# Patient Record
Sex: FEMALE | Race: WHITE | Employment: UNEMPLOYED | URBAN - NONMETROPOLITAN AREA
[De-identification: names, ages, dates, MRNs, and addresses within clinical notes are randomized per-mention and may not be internally consistent; named-entity substitution may affect disease eponyms.]

---

## 2020-11-20 LAB
ALBUMIN SERPL-MCNC: 2.8 G/DL
ALP BLD-CCNC: 77 U/L
ALT SERPL-CCNC: 24 U/L
ANION GAP SERPL CALCULATED.3IONS-SCNC: 8 MMOL/L
AST SERPL-CCNC: 19 U/L
BASOPHILS ABSOLUTE: NORMAL
BASOPHILS RELATIVE PERCENT: NORMAL
BILIRUB SERPL-MCNC: 0.2 MG/DL (ref 0.1–1.4)
BUN BLDV-MCNC: 13 MG/DL
CALCIUM SERPL-MCNC: 8.6 MG/DL
CHLORIDE BLD-SCNC: 113 MMOL/L
CO2: 25 MMOL/L
CREAT SERPL-MCNC: 0.63 MG/DL
EOSINOPHILS ABSOLUTE: NORMAL
EOSINOPHILS RELATIVE PERCENT: NORMAL
GFR CALCULATED: 119
GLUCOSE BLD-MCNC: 87 MG/DL
HCT VFR BLD CALC: 41.3 % (ref 36–46)
HEMOGLOBIN: 14.3 G/DL (ref 12–16)
LYMPHOCYTES ABSOLUTE: NORMAL
LYMPHOCYTES RELATIVE PERCENT: NORMAL
MCH RBC QN AUTO: NORMAL PG
MCHC RBC AUTO-ENTMCNC: NORMAL G/DL
MCV RBC AUTO: NORMAL FL
MONOCYTES ABSOLUTE: NORMAL
MONOCYTES RELATIVE PERCENT: NORMAL
NEUTROPHILS ABSOLUTE: NORMAL
NEUTROPHILS RELATIVE PERCENT: NORMAL
PLATELET # BLD: 294 K/ΜL
PMV BLD AUTO: NORMAL FL
POTASSIUM SERPL-SCNC: 3.8 MMOL/L
RBC # BLD: 4.28 10^6/ΜL
SODIUM BLD-SCNC: 142 MMOL/L
TOTAL PROTEIN: 6.2
WBC # BLD: 6.4 10^3/ML

## 2021-02-02 RX ORDER — VARENICLINE TARTRATE 1 MG/1
1 TABLET, FILM COATED ORAL 2 TIMES DAILY
COMMUNITY

## 2021-02-02 RX ORDER — IBUPROFEN 800 MG/1
800 TABLET ORAL EVERY 6 HOURS PRN
COMMUNITY

## 2021-02-02 RX ORDER — ALBUTEROL SULFATE 90 UG/1
2 AEROSOL, METERED RESPIRATORY (INHALATION) EVERY 6 HOURS PRN
COMMUNITY

## 2021-02-02 RX ORDER — MONTELUKAST SODIUM 10 MG/1
10 TABLET ORAL NIGHTLY
COMMUNITY

## 2021-02-02 RX ORDER — BUDESONIDE AND FORMOTEROL FUMARATE DIHYDRATE 80; 4.5 UG/1; UG/1
2 AEROSOL RESPIRATORY (INHALATION) 2 TIMES DAILY
COMMUNITY

## 2021-02-03 ENCOUNTER — OFFICE VISIT (OUTPATIENT)
Dept: NEPHROLOGY | Age: 31
End: 2021-02-03
Payer: COMMERCIAL

## 2021-02-03 VITALS
SYSTOLIC BLOOD PRESSURE: 118 MMHG | OXYGEN SATURATION: 98 % | DIASTOLIC BLOOD PRESSURE: 78 MMHG | HEART RATE: 74 BPM | WEIGHT: 197 LBS

## 2021-02-03 DIAGNOSIS — R80.0 ISOLATED NON-NEPHROTIC PROTEINURIA: ICD-10-CM

## 2021-02-03 DIAGNOSIS — N18.1 CKD (CHRONIC KIDNEY DISEASE) STAGE 1, GFR 90 ML/MIN OR GREATER: Primary | ICD-10-CM

## 2021-02-03 PROCEDURE — G8484 FLU IMMUNIZE NO ADMIN: HCPCS | Performed by: INTERNAL MEDICINE

## 2021-02-03 PROCEDURE — 99205 OFFICE O/P NEW HI 60 MIN: CPT | Performed by: INTERNAL MEDICINE

## 2021-02-03 PROCEDURE — G8421 BMI NOT CALCULATED: HCPCS | Performed by: INTERNAL MEDICINE

## 2021-02-03 PROCEDURE — 1036F TOBACCO NON-USER: CPT | Performed by: INTERNAL MEDICINE

## 2021-02-03 PROCEDURE — G8427 DOCREV CUR MEDS BY ELIG CLIN: HCPCS | Performed by: INTERNAL MEDICINE

## 2021-02-03 ASSESSMENT — ENCOUNTER SYMPTOMS
ABDOMINAL PAIN: 0
DIARRHEA: 0
VOMITING: 0
SHORTNESS OF BREATH: 0
NAUSEA: 0
SORE THROAT: 0
EYE PAIN: 0
BACK PAIN: 0
EYES NEGATIVE: 1
COUGH: 0

## 2021-02-03 NOTE — PROGRESS NOTES
[facility]         Outpatient Initial consult note         2/3/2021 9:50 AM    [address] [address]  Dept: [address] [place] [address] Street: [phone]      Patient Name:   Christo Botello  YOB: 1990  Primary Care Physician:  Cheikh Sanon MD     Chief Complaint : Evaluation of  proteinuria     History of presenting illness :Christo Botello is a 26 y.o.   female with Past Medical History as stated below was sent / referred by Cheikh Sanon MD forevaluation of the above problem. Patient has no history of hypertension for 0 years. Patient has no history of diabetes mellitus. The patient admits to a history of renal stones andadmits to occasional NSAID use. Patient has a history of proteinuria. Patient Never had a kidney biopsy done. Patient does not use Herbal remedies/vitamin supplements. Patient denies frequency, hematuria, hesitancy. Patient has a family history of kidney disease. Patient has been having swelling, bilateral lower extremities, very severe since almost like 6 months. Has seen the PCP who has given some diuretic which helped some and the patient also used to drink caffeinated drinks which she has stopped and seems to have helped. No associated shortness of breath. No other modifying factors.   Patient had 24-hour protein which said that she is having 1.5 g of protein in the urine and so she was referred to us for further evaluation and management     Past History :     Past Medical History:   Diagnosis Date    UTI (urinary tract infection)      Past Surgical History:   Procedure Laterality Date    KNEE SURGERY Left     x 2     Social History     Socioeconomic History    Marital status:      Spouse name: Not on file    Number of children: Not on file    Years of education: Not on file    Highest education level: Not on file Occupational History    Not on file   Social Needs    Financial resource strain: Not on file    Food insecurity     Worry: Not on file     Inability: Not on file    Transportation needs     Medical: Not on file     Non-medical: Not on file   Tobacco Use    Smoking status: Former Smoker     Packs/day: 0.50    Smokeless tobacco: Never Used   Substance and Sexual Activity    Alcohol use: Not on file    Drug use: Not on file    Sexual activity: Not on file   Lifestyle    Physical activity     Days per week: Not on file     Minutes per session: Not on file    Stress: Not on file   Relationships    Social connections     Talks on phone: Not on file     Gets together: Not on file     Attends Hoahaoism service: Not on file     Active member of club or organization: Not on file     Attends meetings of clubs or organizations: Not on file     Relationship status: Not on file    Intimate partner violence     Fear of current or ex partner: Not on file     Emotionally abused: Not on file     Physically abused: Not on file     Forced sexual activity: Not on file   Other Topics Concern    Not on file   Social History Narrative    Not on file     Family History   Problem Relation Age of Onset    Diabetes Father     Hypertension Father     Cancer Paternal Aunt      Medications & Allergies :      Prior to Admission medications    Medication Sig Start Date End Date Taking?  Authorizing Provider   albuterol sulfate HFA (VENTOLIN HFA) 108 (90 Base) MCG/ACT inhaler Inhale 2 puffs into the lungs every 6 hours as needed for Wheezing   Yes Historical Provider, MD   budesonide-formoterol (SYMBICORT) 80-4.5 MCG/ACT AERO Inhale 2 puffs into the lungs 2 times daily   Yes Historical Provider, MD   ibuprofen (ADVIL;MOTRIN) 800 MG tablet Take 800 mg by mouth every 6 hours as needed for Pain   Yes Historical Provider, MD   montelukast (SINGULAIR) 10 MG tablet Take 10 mg by mouth nightly   Yes Historical Provider, MD   varenicline (CHANTIX) 1 MG tablet Take 1 mg by mouth 2 times daily   Yes Historical Provider, MD     Allergies: Patient has no known allergies. Review of Systems Physical Exam   Review of Systems   Constitutional: Positive for fatigue and unexpected weight change. Negative for activity change, chills and fever. HENT: Negative. Negative for ear pain and sore throat. Eyes: Negative. Negative for pain. Respiratory: Negative for cough and shortness of breath. Cardiovascular: Positive for leg swelling. Negative for chest pain. Gastrointestinal: Negative for abdominal pain, diarrhea, nausea and vomiting. Genitourinary: Negative for dysuria, frequency and hematuria. Musculoskeletal: Negative for back pain and neck pain. Skin: Negative for rash and wound. Neurological: Positive for headaches. Negative for dizziness and light-headedness. Psychiatric/Behavioral: Negative for agitation and confusion. Physical Exam  Vitals signs reviewed. Constitutional:       General: She is not in acute distress. Appearance: She is obese. She is not diaphoretic. HENT:      Head: Normocephalic and atraumatic. Right Ear: External ear normal.      Left Ear: External ear normal.      Nose: Nose normal.      Mouth/Throat:      Mouth: Mucous membranes are moist.   Eyes:      General: No scleral icterus. Right eye: No discharge. Left eye: No discharge. Conjunctiva/sclera: Conjunctivae normal.   Neck:      Musculoskeletal: Normal range of motion and neck supple. Thyroid: No thyromegaly. Vascular: No JVD. Cardiovascular:      Rate and Rhythm: Normal rate and regular rhythm. Heart sounds: Normal heart sounds. No murmur. Pulmonary:      Effort: Pulmonary effort is normal. No respiratory distress. Breath sounds: Normal breath sounds. No stridor. No wheezing or rales. Chest:      Chest wall: No tenderness. Abdominal:      General: Bowel sounds are normal. There is distension. Palpations: Abdomen is soft. Tenderness: There is no abdominal tenderness. Musculoskeletal:         General: No tenderness. Right lower leg: Edema present. Left lower leg: Edema present. Comments: trace   Skin:     General: Skin is warm and dry. Findings: No erythema or rash. Neurological:      General: No focal deficit present. Mental Status: She is alert and oriented to person, place, and time. Psychiatric:         Mood and Affect: Mood normal.         Behavior: Behavior normal.          Vitals   Vitals:    02/03/21 0911   BP: 118/78   Site: Left Upper Arm   Position: Sitting   Cuff Size: Medium Adult   Pulse: 74   SpO2: 98%   Weight: 197 lb (89.4 kg)        Labs, Radiology and Tests :    Labs -    11/20           Potassium 3.8           BUN 13           Creatinine 0.63           eGFR 119                       UPCR 1.5(24)           UMCR                          Renal Ultrasound Scan --will order       Other : old  lab data have been reviewed and noted that patient does have some proteinuria from prior and her creatinine in 2018 is around 0.78    Assessment    1. Renal -as per MDRD patient is chronic kidney disease stage I due to presence of significant proteinuria  -Exact etiology unclear. I will quantify the protein again, send serologic work-up  -Obtain a baseline renal ultrasound scan as well  -Need to consider renal biopsy. Advised not to take nonsteroidal anti-inflammatories. 2. Electrolytes appear to be within normal limits  3. Blood pressure well controlled does not carry diagnosis of hypertension  4. Proteinuria-plan as mentioned above  5. Medications reviewed discussed with the patient  6.  Follow-up in 4 weeks  Plan     Orders Placed This Encounter   Procedures    US RENAL LIMITED    Comprehensive Metabolic Panel    Urinalysis with Microscopic    Microalbumin / Creatinine Urine Ratio    Protein / creatinine ratio, urine    CLINT Screen with Reflex   

## 2021-02-04 DIAGNOSIS — N18.1 CKD (CHRONIC KIDNEY DISEASE) STAGE 1, GFR 90 ML/MIN OR GREATER: ICD-10-CM

## 2021-02-17 LAB
ALBUMIN SERPL-MCNC: 2.7 G/DL
ALP BLD-CCNC: 74 U/L
ALT SERPL-CCNC: 21 U/L
ANION GAP SERPL CALCULATED.3IONS-SCNC: 9 MMOL/L
ANTIBODY: NORMAL
AST SERPL-CCNC: 15 U/L
BILIRUB SERPL-MCNC: 0.2 MG/DL (ref 0.1–1.4)
BUN BLDV-MCNC: 11 MG/DL
CALCIUM SERPL-MCNC: 8.6 MG/DL
CHLORIDE BLD-SCNC: 111 MMOL/L
CO2: 25 MMOL/L
CREAT SERPL-MCNC: 0.61 MG/DL
GFR CALCULATED: 120
GLUCOSE BLD-MCNC: 89 MG/DL
POTASSIUM SERPL-SCNC: 3.8 MMOL/L
SODIUM BLD-SCNC: 141 MMOL/L
TOTAL PROTEIN: 6.3

## 2021-02-18 LAB
BILIRUBIN, URINE: NEGATIVE
BLOOD, URINE: NEGATIVE
CLARITY: CLEAR
COLOR: YELLOW
CREATININE, URINE: 93.9
GLUCOSE URINE: NEGATIVE
KETONES, URINE: NEGATIVE
LEUKOCYTE ESTERASE, URINE: NORMAL
MICROALBUMIN/CREAT 24H UR: 30.9 MG/G{CREAT}
MICROALBUMIN/CREAT UR-RTO: 329
NITRITE, URINE: NEGATIVE
PH UA: 6 (ref 4.5–8)
PROTEIN UA: NORMAL
SPECIFIC GRAVITY, URINE: 1.02
UROBILINOGEN, URINE: NORMAL

## 2021-03-03 ENCOUNTER — TELEPHONE (OUTPATIENT)
Dept: NEPHROLOGY | Age: 31
End: 2021-03-03

## 2021-03-03 ENCOUNTER — OFFICE VISIT (OUTPATIENT)
Dept: NEPHROLOGY | Age: 31
End: 2021-03-03
Payer: COMMERCIAL

## 2021-03-03 VITALS — OXYGEN SATURATION: 98 % | HEART RATE: 89 BPM | DIASTOLIC BLOOD PRESSURE: 70 MMHG | SYSTOLIC BLOOD PRESSURE: 108 MMHG

## 2021-03-03 DIAGNOSIS — R80.0 ISOLATED NON-NEPHROTIC PROTEINURIA: ICD-10-CM

## 2021-03-03 DIAGNOSIS — N18.1 CKD (CHRONIC KIDNEY DISEASE) STAGE 1, GFR 90 ML/MIN OR GREATER: Primary | ICD-10-CM

## 2021-03-03 PROCEDURE — 1036F TOBACCO NON-USER: CPT | Performed by: INTERNAL MEDICINE

## 2021-03-03 PROCEDURE — G8421 BMI NOT CALCULATED: HCPCS | Performed by: INTERNAL MEDICINE

## 2021-03-03 PROCEDURE — G8484 FLU IMMUNIZE NO ADMIN: HCPCS | Performed by: INTERNAL MEDICINE

## 2021-03-03 PROCEDURE — 99213 OFFICE O/P EST LOW 20 MIN: CPT | Performed by: INTERNAL MEDICINE

## 2021-03-03 PROCEDURE — G8427 DOCREV CUR MEDS BY ELIG CLIN: HCPCS | Performed by: INTERNAL MEDICINE

## 2021-03-03 RX ORDER — FUROSEMIDE 20 MG/1
20 TABLET ORAL SEE ADMIN INSTRUCTIONS
Qty: 30 TABLET | Refills: 0 | Status: SHIPPED | OUTPATIENT
Start: 2021-03-03

## 2021-03-03 RX ORDER — LISINOPRIL 2.5 MG/1
2.5 TABLET ORAL DAILY
Qty: 30 TABLET | Refills: 5 | Status: SHIPPED | OUTPATIENT
Start: 2021-03-03

## 2021-03-03 NOTE — TELEPHONE ENCOUNTER
Dr Melba Israel office phoned - asking if its ok for patient to take ibuprofen 800 mg every 6 hrs? [phone]

## 2021-03-03 NOTE — TELEPHONE ENCOUNTER
Prefer not to on a regular basis as a standing order.   But can 2- 3 times a week on a as needed basis

## 2021-03-03 NOTE — PROGRESS NOTES
[facility]        Outpatient Follow-Up note         3/3/2021 9:19 AM    Patient Name:   Keara Clifford  YOB: 1990  Primary Care Physician:  Joan Hernandez MD   [address] [place] [address]  Dept: [phone]  Loc: [phone]     Chief Complaint / Reason for follow-up : Follow Up of proteinuria and fluid overload     Interval History :  Patient seen and examined by me. He is okay. Complains of swelling, bilateral lower extremities, moderate severity, associated with some weight gain almost 13 pounds in the last 1 month. No specific modifying factors.      Past History :  Past Medical History:   Diagnosis Date    UTI (urinary tract infection)      Past Surgical History:   Procedure Laterality Date    KNEE SURGERY Left     x 2        Medications :     Outpatient Medications Marked as Taking for the 3/3/21 encounter (Office Visit) with Fabiano Orellana MD   Medication Sig Dispense Refill    albuterol sulfate HFA (VENTOLIN HFA) 108 (90 Base) MCG/ACT inhaler Inhale 2 puffs into the lungs every 6 hours as needed for Wheezing      budesonide-formoterol (SYMBICORT) 80-4.5 MCG/ACT AERO Inhale 2 puffs into the lungs 2 times daily      montelukast (SINGULAIR) 10 MG tablet Take 10 mg by mouth nightly      varenicline (CHANTIX) 1 MG tablet Take 1 mg by mouth 2 times daily         Vitals     /70 (Site: Left Upper Arm, Position: Sitting, Cuff Size: Medium Adult)   Pulse 89   SpO2 98%  Wt Readings from Last 3 Encounters:   02/03/21 197 lb (89.4 kg)        Physical Exam     General -- no distress  Lungs -- clear  Heart -- S1, S2 heard, JVD - no  Abdomen - soft, non-tender  Extremities --mild edema  CNS - awake and alert    Labs, Radiology and Tests    Labs -     11/20 2/21                 Potassium 3.8  3.8                 BUN 13  11                 Creatinine 0.63  0.61                 eGFR 119  120                                       UPCR 1.5(24)  500                 UMCR    329                                          Serologies-2/21-ANCA negative, CLINT negative, anti-GBM negative, SPEP negative, hepatitis C negative, kappa lambda ratio normal, C4 slightly lower    Renal Ultrasound Scan -- 2/21  Right kidney 10.8 cm left kidney 12.3 cm  Increased echogenicity in the medullary region and possible medullary sponge kidney      Other : old  lab data have been reviewed and noted that patient does have some proteinuria from prior and her creatinine in 2018 is around 0.78     Assessment    1. Renal -as per MDRD patient is chronic kidney disease stage I due to presence of significant proteinuria  -Exact etiology unclear. I will quantify the protein again, serologic work-up negative  -Proteinuria now is around 500 mg. I will start her on 2.5 mg of lisinopril, as needed Lasix  -No acute need for renal biopsy at this time. However if renal function worsens or proteinuria worsens significantly might consider in the near future. 2. Electrolytes appear to be within normal limits  3. Blood pressure well controlled does not carry diagnosis of hypertension  4. Proteinuria-plan as mentioned above  5. Medications reviewed discussed with the patient  6. Follow-up in 4 months      Tests and orders placed this Encounter   No orders of the defined types were placed in this encounter. Ml Bello M.D  [facility].

## 2021-03-04 NOTE — TELEPHONE ENCOUNTER
Spoke to Merry Gacres at Dr Lynch office. Patient to only take ibuprofen 2-3 times weekly on an as needed basis.

## 2021-03-10 ENCOUNTER — TELEPHONE (OUTPATIENT)
Dept: NEPHROLOGY | Age: 31
End: 2021-03-10

## 2021-03-10 NOTE — TELEPHONE ENCOUNTER
Patient called to let you know she was told by Dr. Lonnie Monroe to stop the lisinopril since she was having side effects. Patient was enduring headaches, diarrhea, stomach pain, lightheadedness. She states her bp was normal at there last visit and thinks maybe lisinopril made her bp go lower. What do you want to do?

## 2021-07-01 ENCOUNTER — TELEPHONE (OUTPATIENT)
Dept: NEPHROLOGY | Age: 31
End: 2021-07-01

## 2021-07-01 NOTE — TELEPHONE ENCOUNTER
Patient did not think she was following with us anymore. Stated she received a letter that we would not take her insurance. I reassured her that everything was fine. She stated she still wanted to cancel at this time d/t [place]. She will call to reschedule.